# Patient Record
(demographics unavailable — no encounter records)

---

## 2025-05-15 NOTE — LETTER BODY
[Dear  ___] : Dear  [unfilled], [Courtesy Letter:] : I had the pleasure of seeing your patient, [unfilled], in my office today. [Please see my note below.] : Please see my note below. [Consult Closing:] : Thank you very much for allowing me to participate in the care of this patient.  If you have any questions, please do not hesitate to contact me. [Sincerely,] : Sincerely, [FreeTextEntry3] : Tea Fowler MD Director of Robotic Education [facility] at [facility]   [email or web address] [phone]

## 2025-05-15 NOTE — LETTER BODY
[Dear  ___] : Dear  [unfilled], [Courtesy Letter:] : I had the pleasure of seeing your patient, [unfilled], in my office today. [Please see my note below.] : Please see my note below. [Consult Closing:] : Thank you very much for allowing me to participate in the care of this patient.  If you have any questions, please do not hesitate to contact me. [Sincerely,] : Sincerely, [FreeTextEntry3] : Tea Fowler MD Director of Robotic Education [facility] at [facility]   [email or web address] [phone]

## 2025-05-15 NOTE — HISTORY OF PRESENT ILLNESS
[FreeTextEntry1] : Name MACK HUYNH  MRN [id number]   Aug 10 1939  Contact Number: [phone] ----------------------------------------------------------------------------------------------------------------------------------------- Date of First Visit: 25 Referring Provider/PCP: Dr. Zara Gambino f: [phone] -----------------------------------------------------------------------------------------------------------------------------------------  CC: nocturia, recurrent UTIs  History of Present Illness: MACK HUYNH is a 85 year old female who reports about 2 years of issues with urination. Reports about 3-5 UTIs/year. Symptoms suprapubic discomfort, mild burning, frequency. Goes to urgent care and gets treated with antibiotics. No cultures here. Symptoms go away with antibiotics but after a few weeks feels like come back. No fevers or flank pain with episodes. no hematuria. No kidney stones. No particles in urine. Recent episode last week - just completed 7 days of Macrobid. Symptoms improved but not yet back to baseline.  Outside of infections, urinary frequency, urgency, small amount of urge incontinence. Wears 1-3 pads a day but not very wet. Does not feel like empties bladder fully. Does not feel like has prolapse. Nocturia.  Prior treatments: a lot of water, abx with episodes  Patient reports had episode of back pain 2023 and had CT scan that showed small mass. Ultimately felt to be secondary to spinal stenosis.  CT ABDOMEN PELVIS WITHOUT IV CONTRAST 2023 Findings:  01. LIVER: Hepatic dome has been excluded. Otherwise, normal unenhanced. 02. SPLEEN: Normal unenhanced.  03. PANCREAS: Normal unenhanced. 04. GALLBLADDER/BILIARY TREE: Normal gallbladder. Normal caliber biliary tree.  05. ADRENALS: Normal. 06. KIDNEYS: Symmetric in size. Right posterior interpolar 9 mm partially exophytic lesion does not meet the criteria for a simple cyst (series 2 image 19). Mild right hydronephrosis to the ureteropelvic junction. No left hydronephrosis. No renal calculi. 07. LYMPHADENOPATHY/RETROPERITONEUM: No lymphadenopathy. 08. BOWEL: Small hiatal hernia. No bowel obstruction.  09. PELVIC VISCERA: Normal bladder, uterus and adnexa. 10. PELVIC LYMPH NODES: No pelvic lymphadenopathy. 11. VASCULATURE: Mild to moderate atherosclerotic disease of the abdominal aorta without aneurysm. 12. PERITONEUM/ABDOMINAL WALL: No ascites.    13. SKELETAL: Degenerative changes in the spine.  No aggressive lesion.   14. LUNG BASES: Clear.   IMPRESSION:  No renal calculus. Mild right hydronephrosis to the ureteropelvic junction possibly secondary to UPJ obstruction. Indeterminate right posterior interpolar lesion may represent a hemorrhagic/proteinaceous cyst or a neoplasm. If prior examinations are unavailable for comparison, a contrast-enhanced examination may be performed for further evaluation.  Bladder triggers: occasional caffeine, no carbonation, a lot of citrus, no spicy foods, a lot of tomatoes, occasional pineapple, no alcohol, no smoking  PVR (to ensure adequate emptying): 14    PMH: spinal stenosis, arthritis, HTN, hypothyroid PSH: shoulder replacement, knee replacements,  Family History: no  malignancies Social: , 4 children, retired  now takes care of son, never smoker, no alcohol, no recreational drugs Meds: amlodipine, besylate, levothyroxine, lisinopril, atorvastatin, calcium, metoprolol Allergies: NKDA ROS: no fevers, chills, flank pain

## 2025-05-15 NOTE — ASSESSMENT
[FreeTextEntry1] : 85 year old with recurrent UTIs, OAB, small renal mass on iamging, incidental R UPJO.  1) Recurrent UTIs: Recurrent UTI is defined as two episodes of acute bacterial cystitis within six months or three episodes within one year. Patient meets criteria. Episodes are classified as uncomplicated given absence of known anatomic or functional abnormality of the urinary tract, immunocompromised host,  infection with multi-drug resistant bacteria, or recurrence within two weeks of initial treatment. We discussed the natural history of UTIs and strategies to prevent incidence of UTIs. We discussed the data related to increase water intake and cranberry extract daily. We discussed antibiotic prophylaxis both continuous and post-coital. Discussed role of estrace.  2) OAB: OAB is a clinical diagnosis characterized by the presence of bothersome urinary symptoms. It is a symptom complex with a variable and chronic course that needs to be managed over time, that it primarily affects QOL, that there is no single ideal treatment and that available treatments vary in the effort required from the patient as well as in invasiveness, risk of adverse events and reversibility. First line treatment is behavioral modifications: bladder training with pelvic floor physical therapy, fluid management, caffeine reduction, dietary changes (avoiding bladder irritants). Next line oral anti-muscarinics or oral 3-adrenoceptor agonists. Third line PTNS and neuromodulation. Fourth line more invasive surgical treatment. Not interested in PFPT. Wants to trial med. Vibegron. The side effects of Vibegron were discussed in detail. These include but are not limited to allergic reaction (hives, problems breathing), elevation of blood pressure, fast heartbeat, difficulty urinating, headache, dry eyes, dry mouth, constipation, and nasal congestion.  3) Renal mass - Right posterior interpolar 9 mm partially exophytic lesion does not meet the criteria for a simple cyst.  may represent a hemorrhagic/proteinaceous cyst or a neoplasm. We reviewed the possible underlying histology of solid enhancing renal masses, with the majority being malignant (~80%) whereas ~20% are benign (e.g. oncocytoma).  We discussed the role/possibility of percutaneous biopsy, with the associated risks, benefits, complications, and accuracy issues (e.g. risk of false negative results).The heterogeneous natural history/biology of renal cell carcinoma was discussed, including the fact that while many renal cancers are indolent, others behave aggressively. Options were reviewed including, not limited to, active surveillance (AS), surgical extirpation and ablation. At this time will reimage given has not been images since 9/2023.  4) UPJO incidental on imaging - will revaluate with CTU and go from there and Cr today. No CVA tenderness, does report back pain but due to spinal stenosis. Never had complicated UTI and unclear if related.  Plan: - UA culture - BMP - estrogen cream - instructed on use - probiotics - cranberry pills - vibegron - CTU for assessment renal mass and UPJO - fu after imaging  I am the primary provider managing this condition and will be seeing the patient longitudinally.

## 2025-05-15 NOTE — HISTORY OF PRESENT ILLNESS
[FreeTextEntry1] : Name MACK HUYNH  MRN [id number]   Aug 10 1939  Contact Number: [phone] ----------------------------------------------------------------------------------------------------------------------------------------- Date of First Visit: 25 Referring Provider/PCP: Dr. Zara Gambino f: [phone] -----------------------------------------------------------------------------------------------------------------------------------------  CC: nocturia, recurrent UTIs  History of Present Illness: MACK HUYNH is a 85 year old female who reports about 2 years of issues with urination. Reports about 3-5 UTIs/year. Symptoms suprapubic discomfort, mild burning, frequency. Goes to urgent care and gets treated with antibiotics. No cultures here. Symptoms go away with antibiotics but after a few weeks feels like come back. No fevers or flank pain with episodes. no hematuria. No kidney stones. No particles in urine. Recent episode last week - just completed 7 days of Macrobid. Symptoms improved but not yet back to baseline.  Outside of infections, urinary frequency, urgency, small amount of urge incontinence. Wears 1-3 pads a day but not very wet. Does not feel like empties bladder fully. Does not feel like has prolapse. Nocturia.  Prior treatments: a lot of water, abx with episodes  Patient reports had episode of back pain 2023 and had CT scan that showed small mass. Ultimately felt to be secondary to spinal stenosis.  CT ABDOMEN PELVIS WITHOUT IV CONTRAST 2023 Findings:  01. LIVER: Hepatic dome has been excluded. Otherwise, normal unenhanced. 02. SPLEEN: Normal unenhanced.  03. PANCREAS: Normal unenhanced. 04. GALLBLADDER/BILIARY TREE: Normal gallbladder. Normal caliber biliary tree.  05. ADRENALS: Normal. 06. KIDNEYS: Symmetric in size. Right posterior interpolar 9 mm partially exophytic lesion does not meet the criteria for a simple cyst (series 2 image 19). Mild right hydronephrosis to the ureteropelvic junction. No left hydronephrosis. No renal calculi. 07. LYMPHADENOPATHY/RETROPERITONEUM: No lymphadenopathy. 08. BOWEL: Small hiatal hernia. No bowel obstruction.  09. PELVIC VISCERA: Normal bladder, uterus and adnexa. 10. PELVIC LYMPH NODES: No pelvic lymphadenopathy. 11. VASCULATURE: Mild to moderate atherosclerotic disease of the abdominal aorta without aneurysm. 12. PERITONEUM/ABDOMINAL WALL: No ascites.    13. SKELETAL: Degenerative changes in the spine.  No aggressive lesion.   14. LUNG BASES: Clear.   IMPRESSION:  No renal calculus. Mild right hydronephrosis to the ureteropelvic junction possibly secondary to UPJ obstruction. Indeterminate right posterior interpolar lesion may represent a hemorrhagic/proteinaceous cyst or a neoplasm. If prior examinations are unavailable for comparison, a contrast-enhanced examination may be performed for further evaluation.  Bladder triggers: occasional caffeine, no carbonation, a lot of citrus, no spicy foods, a lot of tomatoes, occasional pineapple, no alcohol, no smoking  PVR (to ensure adequate emptying): 14    PMH: spinal stenosis, arthritis, HTN, hypothyroid PSH: shoulder replacement, knee replacements,  Family History: no  malignancies Social: , 4 children, retired  now takes care of son, never smoker, no alcohol, no recreational drugs Meds: amlodipine, besylate, levothyroxine, lisinopril, atorvastatin, calcium, metoprolol Allergies: NKDA ROS: no fevers, chills, flank pain

## 2025-05-22 NOTE — LETTER BODY
[Dear  ___] : Dear  [unfilled], [Courtesy Letter:] : I had the pleasure of seeing your patient, [unfilled], in my office today. [Please see my note below.] : Please see my note below. [Consult Closing:] : Thank you very much for allowing me to participate in the care of this patient.  If you have any questions, please do not hesitate to contact me. [Sincerely,] : Sincerely, [FreeTextEntry3] : Tea Fowler MD Director of Robotic Education [facility] for [facility] at [facility]   [email or web address] [phone]

## 2025-05-22 NOTE — HISTORY OF PRESENT ILLNESS
[FreeTextEntry1] : Name MACK HUYNH  MRN [id number]   Aug 10 1939  Contact Number: [phone] ----------------------------------------------------------------------------------------------------------------------------------------- Date of First Visit: 25 Referring Provider/PCP: Dr. Zara Gambino f: [phone] -----------------------------------------------------------------------------------------------------------------------------------------  CC: nocturia, recurrent UTIs  History of Present Illness: MACK HUYNH is a 85 year old female who reports about 2 years of issues with urination. Reports about 3-5 UTIs/year. Symptoms suprapubic discomfort, mild burning, frequency. Goes to urgent care and gets treated with antibiotics. No cultures here. Symptoms go away with antibiotics but after a few weeks feels like come back. No fevers or flank pain with episodes. no hematuria. No kidney stones. No particles in urine. Recent episode last week - just completed 7 days of Macrobid. Symptoms improved but not yet back to baseline.  Outside of infections, urinary frequency, urgency, small amount of urge incontinence. Wears 1-3 pads a day but not very wet. Does not feel like empties bladder fully. Does not feel like has prolapse. Nocturia.  Prior treatments: a lot of water, abx with episodes  Patient reports had episode of back pain 2023 and had CT scan that showed small mass. Ultimately felt to be secondary to spinal stenosis.  CT ABDOMEN PELVIS WITHOUT IV CONTRAST 2023 Findings:  01. LIVER: Hepatic dome has been excluded. Otherwise, normal unenhanced. 02. SPLEEN: Normal unenhanced.  03. PANCREAS: Normal unenhanced. 04. GALLBLADDER/BILIARY TREE: Normal gallbladder. Normal caliber biliary tree.  05. ADRENALS: Normal. 06. KIDNEYS: Symmetric in size. Right posterior interpolar 9 mm partially exophytic lesion does not meet the criteria for a simple cyst (series 2 image 19). Mild right hydronephrosis to the ureteropelvic junction. No left hydronephrosis. No renal calculi. 07. LYMPHADENOPATHY/RETROPERITONEUM: No lymphadenopathy. 08. BOWEL: Small hiatal hernia. No bowel obstruction.  09. PELVIC VISCERA: Normal bladder, uterus and adnexa. 10. PELVIC LYMPH NODES: No pelvic lymphadenopathy. 11. VASCULATURE: Mild to moderate atherosclerotic disease of the abdominal aorta without aneurysm. 12. PERITONEUM/ABDOMINAL WALL: No ascites.    13. SKELETAL: Degenerative changes in the spine.  No aggressive lesion.   14. LUNG BASES: Clear.   IMPRESSION:  No renal calculus. Mild right hydronephrosis to the ureteropelvic junction possibly secondary to UPJ obstruction. Indeterminate right posterior interpolar lesion may represent a hemorrhagic/proteinaceous cyst or a neoplasm. If prior examinations are unavailable for comparison, a contrast-enhanced examination may be performed for further evaluation.  Bladder triggers: occasional caffeine, no carbonation, a lot of citrus, no spicy foods, a lot of tomatoes, occasional pineapple, no alcohol, no smoking  PVR (to ensure adequate emptying): 14   ---------------------------------------------------------------------------------------------------------------------------------------- Interval History (2025):  CTU 25: FINDINGS: VISUALIZED PORTION OF CHEST LUNGS: Stable scarring left base and stable compressive atelectasis medial segment right middle lobe. New 6 mm pleural-based nodule left lower lobe HEART: Unremarkable. FINDINGS: ABDOMEN/PELVIS LIVER: The liver is normal in size and morphology. No evidence for fatty liver. No evidence for mass lesion by IV contrast enhanced technique. BILIARY: Gallbladder is visualized. Normal caliber common bile duct. PANCREAS: Normal configuration with no mass or pancreatic duct dilatation. SPLEEN: Normal in size. No splenic lesion. ADRENAL GLANDS: The right adrenal gland is normal left adrenal gland is hyperplastic KIDNEYS: 6 x 7 mm enhancing cortical lesion posterior aspect mid pole right kidney and on examination with my measurements this measured 8 x 7 mm Both kidneys demonstrate normal function bilaterally with no evidence of renal calculi. There are no filling defects demonstrated the collecting systems of both kidneys. Bilateral dilated extrarenal pelvis. Fullness is demonstrated in the collecting system right kidney without change and no evidence of hydronephrosis left kidney. Stable 9 mm cyst left kidney posterior aspect mid pole no further workup or follow-up needed. URETERS: The ureters demonstrate normal course and caliber. There are no filling defects in the collecting system. No ureteral calculi. URINARY BLADDER: The bladder has normal appearance with a thin wall and without mass lesion or calculus. REPRODUCTIVE ORGANS: Uterus is normal in appearance. No discrete adnexal mass. STOMACH: Unremarkable. SMALL BOWEL: There is a new nonobstructive small bowel loop in the indirect left inguinal hernia LARGE BOWEL: Unremarkable. PERITONEUM: The peritoneum is without evidence for mass lesion or ascites. LYMPH NODES: No enlarged lymph nodes within paraaortic and iliac chain distribution. No enlarged mesenteric nodes or deep pelvic lymph nodes. VASCULATURE: Atherosclerotic calcification with no evidence of aneurysm SOFT TISSUES: Small containing umbilical hernia. Small left indirect left inguinal hernia containing fat and nonobstructed small bowel. BONES: Multilevel degenerative changes in the lower thoracic and lumbar spines.  IMPRESSION:   1. Stable 6 x 7 mm enhancing cortical lesion in the posterior aspect mid pole right kidney with two-year stability continue follow-up in 2-3 year's time. 2. Small left direct inguinal hernia containing fat and a nonobstructive loop of jejunum. 3.New 6 mm pleural-based nodule left lower lobe CT of the chest is recommended.  ---------------------------------------------------------------------------------------------------------------------------------------- PMH: spinal stenosis, arthritis, HTN, hypothyroid PSH: shoulder replacement, knee replacements,  Family History: no  malignancies Social: , 4 children, retired  now takes care of son, never smoker, no alcohol, no recreational drugs Meds: amlodipine, besylate, levothyroxine, lisinopril, atorvastatin, calcium, metoprolol Allergies: NKDA ROS: no fevers, chills, flank pain

## 2025-05-23 NOTE — ASSESSMENT
S/p stent placement in February.   Non-compliant with ASA and Plavix.   ASA restarted 4/12/25  CV following   [FreeTextEntry1] : 85 year old with recurrent UTIs, OAB, small renal mass on iamging, incidental R UPJO.  1) Recurrent UTIs: Recurrent UTI is defined as two episodes of acute bacterial cystitis within six months or three episodes within one year. Patient meets criteria. Episodes are classified as uncomplicated given absence of known anatomic or functional abnormality of the urinary tract, immunocompromised host,  infection with multi-drug resistant bacteria, or recurrence within two weeks of initial treatment. We discussed the natural history of UTIs and strategies to prevent incidence of UTIs. We discussed the data related to increase water intake and cranberry extract daily. We discussed antibiotic prophylaxis both continuous and post-coital. Continue estrace.  2) OAB: OAB is a clinical diagnosis characterized by the presence of bothersome urinary symptoms. It is a symptom complex with a variable and chronic course that needs to be managed over time, that it primarily affects QOL, that there is no single ideal treatment and that available treatments vary in the effort required from the patient as well as in invasiveness, risk of adverse events and reversibility. First line treatment is behavioral modifications: bladder training with pelvic floor physical therapy, fluid management, caffeine reduction, dietary changes (avoiding bladder irritants). Next line oral anti-muscarinics or oral 3-adrenoceptor agonists. Third line PTNS and neuromodulation. Fourth line more invasive surgical treatment. Not interested in PFPT. Plan for mirabegron given bibegron not covered. The side effects of Mirabegron were discussed in detail. These include but are not limited to allergic reaction (hives, problems breathing), elevation of blood pressure, fast heartbeat, difficulty urinating, headache, dry eyes, dry mouth, constipation, and nasal congestion. Continue estrace cream.  3) Renal mass - 2023: Right posterior interpolar 9 mm partially exophytic lesion does not meet the criteria for a simple cyst.  may represent a hemorrhagic/proteinaceous cyst or a neoplasm. CTU 5/2025: Stable 6 x 7 mm enhancing cortical lesion in the posterior aspect mid pole right kidney with two-year stability.  We reviewed the possible underlying histology of solid enhancing renal masses, with the majority being malignant (~80%) whereas ~20% are benign (e.g. oncocytoma).  We discussed the role/possibility of percutaneous biopsy, with the associated risks, benefits, complications, and accuracy issues (e.g. risk of false negative results).The heterogeneous natural history/biology of renal cell carcinoma was discussed, including the fact that while many renal cancers are indolent, others behave aggressively. Options were reviewed including, not limited to, active surveillance (AS), surgical extirpation and ablation. The risks of tumor growth and metastasis on active surveillance were reviewed, including the fact that metastatic progression on AS could mean missing the opportunity for cure.  The average growth rate of ~2-3 mm/year and metastasis rates of ~2-3% on AS over 5 year interval for small renal masses <4 cm was discussed. Given age, size, stability will continue to monitor, plan for imaging 1 year.  4) UPJO incidental on imaging - Cr wnl. No CVA tenderness, does report back pain but due to spinal stenosis. Never had complicated UTI and unclear if related. CTU 5/2025 Bilateral dilated extrarenal pelvis. Fullness is demonstrated in the collecting system right kidney without change and no evidence of hydronephrosis left kidney. Images reviewed - suspect incidental finding. Options discussed with patient including further work-up with Mag3 vs observation. Given stability fo finding, no cindy hydro, normal renal function, no complicated UTIs will observe for now with observation of renal lesion.  Plan: - continue estrogen cream - instructed on use - probiotics - cranberry pills - start vibegron - imaging one year for renal imaging - PCP re incidental findings - discussed hernia and pulm nodule - sent message to [facility] for hernia - fu 6 weeks impact mirabegron  I am the primary provider managing this condition and will be seeing the patient longitudinally.

## 2025-05-23 NOTE — HISTORY OF PRESENT ILLNESS
[FreeTextEntry1] : Name MACK HUYNH  MRN [id number]   Aug 10 1939  Contact Number: [phone] ----------------------------------------------------------------------------------------------------------------------------------------- Date of First Visit: 25 Referring Provider/PCP: Dr. Zara Gambino f: [phone] -----------------------------------------------------------------------------------------------------------------------------------------  CC: nocturia, recurrent UTIs  History of Present Illness: MACK HUYNH is a 85 year old female who reports about 2 years of issues with urination. Reports about 3-5 UTIs/year. Symptoms suprapubic discomfort, mild burning, frequency. Goes to urgent care and gets treated with antibiotics. No cultures here. Symptoms go away with antibiotics but after a few weeks feels like come back. No fevers or flank pain with episodes. no hematuria. No kidney stones. No particles in urine. Recent episode last week - just completed 7 days of Macrobid. Symptoms improved but not yet back to baseline.  Outside of infections, urinary frequency, urgency, small amount of urge incontinence. Wears 1-3 pads a day but not very wet. Does not feel like empties bladder fully. Does not feel like has prolapse. Nocturia.  Prior treatments: a lot of water, abx with episodes  Patient reports had episode of back pain 2023 and had CT scan that showed small mass. Ultimately felt to be secondary to spinal stenosis.  CT ABDOMEN PELVIS WITHOUT IV CONTRAST 2023 Findings:  01. LIVER: Hepatic dome has been excluded. Otherwise, normal unenhanced. 02. SPLEEN: Normal unenhanced.  03. PANCREAS: Normal unenhanced. 04. GALLBLADDER/BILIARY TREE: Normal gallbladder. Normal caliber biliary tree.  05. ADRENALS: Normal. 06. KIDNEYS: Symmetric in size. Right posterior interpolar 9 mm partially exophytic lesion does not meet the criteria for a simple cyst (series 2 image 19). Mild right hydronephrosis to the ureteropelvic junction. No left hydronephrosis. No renal calculi. 07. LYMPHADENOPATHY/RETROPERITONEUM: No lymphadenopathy. 08. BOWEL: Small hiatal hernia. No bowel obstruction.  09. PELVIC VISCERA: Normal bladder, uterus and adnexa. 10. PELVIC LYMPH NODES: No pelvic lymphadenopathy. 11. VASCULATURE: Mild to moderate atherosclerotic disease of the abdominal aorta without aneurysm. 12. PERITONEUM/ABDOMINAL WALL: No ascites.    13. SKELETAL: Degenerative changes in the spine.  No aggressive lesion.   14. LUNG BASES: Clear.   IMPRESSION:  No renal calculus. Mild right hydronephrosis to the ureteropelvic junction possibly secondary to UPJ obstruction. Indeterminate right posterior interpolar lesion may represent a hemorrhagic/proteinaceous cyst or a neoplasm. If prior examinations are unavailable for comparison, a contrast-enhanced examination may be performed for further evaluation.  Bladder triggers: occasional caffeine, no carbonation, a lot of citrus, no spicy foods, a lot of tomatoes, occasional pineapple, no alcohol, no smoking  PVR (to ensure adequate emptying): 14   ---------------------------------------------------------------------------------------------------------------------------------------- Interval History (2025):  Labs 25: UA nit neg LE small 3 WBC 0 RBC +epithelial cells Culture <10K phill BMP Cr 0.7  CTU 25: FINDINGS: VISUALIZED PORTION OF CHEST LUNGS: Stable scarring left base and stable compressive atelectasis medial segment right middle lobe. New 6 mm pleural-based nodule left lower lobe HEART: Unremarkable. FINDINGS: ABDOMEN/PELVIS LIVER: The liver is normal in size and morphology. No evidence for fatty liver. No evidence for mass lesion by IV contrast enhanced technique. BILIARY: Gallbladder is visualized. Normal caliber common bile duct. PANCREAS: Normal configuration with no mass or pancreatic duct dilatation. SPLEEN: Normal in size. No splenic lesion. ADRENAL GLANDS: The right adrenal gland is normal left adrenal gland is hyperplastic KIDNEYS: 6 x 7 mm enhancing cortical lesion posterior aspect mid pole right kidney and on examination with my measurements this measured 8 x 7 mm Both kidneys demonstrate normal function bilaterally with no evidence of renal calculi. There are no filling defects demonstrated the collecting systems of both kidneys. Bilateral dilated extrarenal pelvis. Fullness is demonstrated in the collecting system right kidney without change and no evidence of hydronephrosis left kidney. Stable 9 mm cyst left kidney posterior aspect mid pole no further workup or follow-up needed. URETERS: The ureters demonstrate normal course and caliber. There are no filling defects in the collecting system. No ureteral calculi. URINARY BLADDER: The bladder has normal appearance with a thin wall and without mass lesion or calculus. REPRODUCTIVE ORGANS: Uterus is normal in appearance. No discrete adnexal mass. STOMACH: Unremarkable. SMALL BOWEL: There is a new nonobstructive small bowel loop in the indirect left inguinal hernia LARGE BOWEL: Unremarkable. PERITONEUM: The peritoneum is without evidence for mass lesion or ascites. LYMPH NODES: No enlarged lymph nodes within paraaortic and iliac chain distribution. No enlarged mesenteric nodes or deep pelvic lymph nodes. VASCULATURE: Atherosclerotic calcification with no evidence of aneurysm SOFT TISSUES: Small containing umbilical hernia. Small left indirect left inguinal hernia containing fat and nonobstructed small bowel. BONES: Multilevel degenerative changes in the lower thoracic and lumbar spines.  IMPRESSION:   1. Stable 6 x 7 mm enhancing cortical lesion in the posterior aspect mid pole right kidney with two-year stability continue follow-up in 2-3 year's time. 2. Small left direct inguinal hernia containing fat and a nonobstructive loop of jejunum. 3.New 6 mm pleural-based nodule left lower lobe CT of the chest is recommended.  Started estrcae cream. Insurance did not cover vibegron.   PVR (to ensure adequate emptying): 75 ---------------------------------------------------------------------------------------------------------------------------------------- PMH: spinal stenosis, arthritis, HTN, hypothyroid PSH: shoulder replacement, knee replacements,  Family History: no  malignancies Social: , 4 children, retired  now takes care of son, never smoker, no alcohol, no recreational drugs Meds: amlodipine, besylate, levothyroxine, lisinopril, atorvastatin, calcium, metoprolol Allergies: NKDA ROS: no fevers, chills, flank pain

## 2025-05-23 NOTE — LETTER BODY
[FreeTextEntry3] : Tea Fowler MD Director of Robotic Education [facility]   [email or web address] [phone]

## 2025-05-30 NOTE — HISTORY OF PRESENT ILLNESS
[FreeTextEntry1] : Name MACK HUYNH  MRN [id number]   Aug 10 1939  Contact Number: [phone] ----------------------------------------------------------------------------------------------------------------------------------------- Date of First Visit: 25 Referring Provider/PCP: Dr. Zara Gambino f: [phone] -----------------------------------------------------------------------------------------------------------------------------------------  CC: nocturia, recurrent UTIs  History of Present Illness: MACK HUYNH is a 85 year old female who reports about 2 years of issues with urination. Reports about 3-5 UTIs/year. Symptoms suprapubic discomfort, mild burning, frequency. Goes to urgent care and gets treated with antibiotics. No cultures here. Symptoms go away with antibiotics but after a few weeks feels like come back. No fevers or flank pain with episodes. no hematuria. No kidney stones. No particles in urine. Recent episode last week - just completed 7 days of Macrobid. Symptoms improved but not yet back to baseline.  Outside of infections, urinary frequency, urgency, small amount of urge incontinence. Wears 1-3 pads a day but not very wet. Does not feel like empties bladder fully. Does not feel like has prolapse. Nocturia.  Prior treatments: a lot of water, abx with episodes  Patient reports had episode of back pain 2023 and had CT scan that showed small mass. Ultimately felt to be secondary to spinal stenosis.  CT ABDOMEN PELVIS WITHOUT IV CONTRAST 2023 Findings:  01. LIVER: Hepatic dome has been excluded. Otherwise, normal unenhanced. 02. SPLEEN: Normal unenhanced.  03. PANCREAS: Normal unenhanced. 04. GALLBLADDER/BILIARY TREE: Normal gallbladder. Normal caliber biliary tree.  05. ADRENALS: Normal. 06. KIDNEYS: Symmetric in size. Right posterior interpolar 9 mm partially exophytic lesion does not meet the criteria for a simple cyst (series 2 image 19). Mild right hydronephrosis to the ureteropelvic junction. No left hydronephrosis. No renal calculi. 07. LYMPHADENOPATHY/RETROPERITONEUM: No lymphadenopathy. 08. BOWEL: Small hiatal hernia. No bowel obstruction.  09. PELVIC VISCERA: Normal bladder, uterus and adnexa. 10. PELVIC LYMPH NODES: No pelvic lymphadenopathy. 11. VASCULATURE: Mild to moderate atherosclerotic disease of the abdominal aorta without aneurysm. 12. PERITONEUM/ABDOMINAL WALL: No ascites.    13. SKELETAL: Degenerative changes in the spine.  No aggressive lesion.   14. LUNG BASES: Clear.   IMPRESSION:  No renal calculus. Mild right hydronephrosis to the ureteropelvic junction possibly secondary to UPJ obstruction. Indeterminate right posterior interpolar lesion may represent a hemorrhagic/proteinaceous cyst or a neoplasm. If prior examinations are unavailable for comparison, a contrast-enhanced examination may be performed for further evaluation.  Bladder triggers: occasional caffeine, no carbonation, a lot of citrus, no spicy foods, a lot of tomatoes, occasional pineapple, no alcohol, no smoking  PVR (to ensure adequate emptying): 14   ---------------------------------------------------------------------------------------------------------------------------------------- Interval History (2025):  Labs 25: UA nit neg LE small 3 WBC 0 RBC +epithelial cells Culture <10K phill BMP Cr 0.7  CTU 25: FINDINGS: VISUALIZED PORTION OF CHEST LUNGS: Stable scarring left base and stable compressive atelectasis medial segment right middle lobe. New 6 mm pleural-based nodule left lower lobe HEART: Unremarkable. FINDINGS: ABDOMEN/PELVIS LIVER: The liver is normal in size and morphology. No evidence for fatty liver. No evidence for mass lesion by IV contrast enhanced technique. BILIARY: Gallbladder is visualized. Normal caliber common bile duct. PANCREAS: Normal configuration with no mass or pancreatic duct dilatation. SPLEEN: Normal in size. No splenic lesion. ADRENAL GLANDS: The right adrenal gland is normal left adrenal gland is hyperplastic KIDNEYS: 6 x 7 mm enhancing cortical lesion posterior aspect mid pole right kidney and on examination with my measurements this measured 8 x 7 mm Both kidneys demonstrate normal function bilaterally with no evidence of renal calculi. There are no filling defects demonstrated the collecting systems of both kidneys. Bilateral dilated extrarenal pelvis. Fullness is demonstrated in the collecting system right kidney without change and no evidence of hydronephrosis left kidney. Stable 9 mm cyst left kidney posterior aspect mid pole no further workup or follow-up needed. URETERS: The ureters demonstrate normal course and caliber. There are no filling defects in the collecting system. No ureteral calculi. URINARY BLADDER: The bladder has normal appearance with a thin wall and without mass lesion or calculus. REPRODUCTIVE ORGANS: Uterus is normal in appearance. No discrete adnexal mass. STOMACH: Unremarkable. SMALL BOWEL: There is a new nonobstructive small bowel loop in the indirect left inguinal hernia LARGE BOWEL: Unremarkable. PERITONEUM: The peritoneum is without evidence for mass lesion or ascites. LYMPH NODES: No enlarged lymph nodes within paraaortic and iliac chain distribution. No enlarged mesenteric nodes or deep pelvic lymph nodes. VASCULATURE: Atherosclerotic calcification with no evidence of aneurysm SOFT TISSUES: Small containing umbilical hernia. Small left indirect left inguinal hernia containing fat and nonobstructed small bowel. BONES: Multilevel degenerative changes in the lower thoracic and lumbar spines.  IMPRESSION:   1. Stable 6 x 7 mm enhancing cortical lesion in the posterior aspect mid pole right kidney with two-year stability continue follow-up in 2-3 year's time. 2. Small left direct inguinal hernia containing fat and a nonobstructive loop of jejunum. 3.New 6 mm pleural-based nodule left lower lobe CT of the chest is recommended.  Started Estrace cream. Insurance did not cover vibegron.   PVR (to ensure adequate emptying): 75 ---------------------------------------------------------------------------------------------------------------------------------------- Interval History (2025):  Mirabegron was not covered. Symptoms baseline. Nipple sensitivity with estrace - stopped cream and subsided. Otherwise doing well, denies UTI symptoms. ---------------------------------------------------------------------------------------------------------------------------------------- PMH: spinal stenosis, arthritis, HTN, hypothyroid PSH: shoulder replacement, knee replacements,  Family History: no  malignancies Social: , 4 children, retired  now takes care of son, never smoker, no alcohol, no recreational drugs Meds: amlodipine, besylate, levothyroxine, lisinopril, atorvastatin, calcium, metoprolol Allergies: NKDA ROS: no fevers, chills, flank pain

## 2025-05-30 NOTE — LETTER BODY
[Dear  ___] : Dear  [unfilled], [Courtesy Letter:] : I had the pleasure of seeing your patient, [unfilled], in my office today. [Please see my note below.] : Please see my note below. [Consult Closing:] : Thank you very much for allowing me to participate in the care of this patient.  If you have any questions, please do not hesitate to contact me. [Sincerely,] : Sincerely, [FreeTextEntry3] : Tea Fowler MD Director of Robotic Education [facility]   [email or web address] [phone]

## 2025-05-30 NOTE — HISTORY OF PRESENT ILLNESS
[FreeTextEntry1] : Name MACK HUYNH  MRN [id number]   Aug 10 1939  Contact Number: [phone] ----------------------------------------------------------------------------------------------------------------------------------------- Date of First Visit: 25 Referring Provider/PCP: Dr. Zara Gambino f: [phone] -----------------------------------------------------------------------------------------------------------------------------------------  CC: nocturia, recurrent UTIs  History of Present Illness: MACK HUYNH is a 85 year old female who reports about 2 years of issues with urination. Reports about 3-5 UTIs/year. Symptoms suprapubic discomfort, mild burning, frequency. Goes to urgent care and gets treated with antibiotics. No cultures here. Symptoms go away with antibiotics but after a few weeks feels like come back. No fevers or flank pain with episodes. no hematuria. No kidney stones. No particles in urine. Recent episode last week - just completed 7 days of Macrobid. Symptoms improved but not yet back to baseline.  Outside of infections, urinary frequency, urgency, small amount of urge incontinence. Wears 1-3 pads a day but not very wet. Does not feel like empties bladder fully. Does not feel like has prolapse. Nocturia.  Prior treatments: a lot of water, abx with episodes  Patient reports had episode of back pain 2023 and had CT scan that showed small mass. Ultimately felt to be secondary to spinal stenosis.  CT ABDOMEN PELVIS WITHOUT IV CONTRAST 2023 Findings:  01. LIVER: Hepatic dome has been excluded. Otherwise, normal unenhanced. 02. SPLEEN: Normal unenhanced.  03. PANCREAS: Normal unenhanced. 04. GALLBLADDER/BILIARY TREE: Normal gallbladder. Normal caliber biliary tree.  05. ADRENALS: Normal. 06. KIDNEYS: Symmetric in size. Right posterior interpolar 9 mm partially exophytic lesion does not meet the criteria for a simple cyst (series 2 image 19). Mild right hydronephrosis to the ureteropelvic junction. No left hydronephrosis. No renal calculi. 07. LYMPHADENOPATHY/RETROPERITONEUM: No lymphadenopathy. 08. BOWEL: Small hiatal hernia. No bowel obstruction.  09. PELVIC VISCERA: Normal bladder, uterus and adnexa. 10. PELVIC LYMPH NODES: No pelvic lymphadenopathy. 11. VASCULATURE: Mild to moderate atherosclerotic disease of the abdominal aorta without aneurysm. 12. PERITONEUM/ABDOMINAL WALL: No ascites.    13. SKELETAL: Degenerative changes in the spine.  No aggressive lesion.   14. LUNG BASES: Clear.   IMPRESSION:  No renal calculus. Mild right hydronephrosis to the ureteropelvic junction possibly secondary to UPJ obstruction. Indeterminate right posterior interpolar lesion may represent a hemorrhagic/proteinaceous cyst or a neoplasm. If prior examinations are unavailable for comparison, a contrast-enhanced examination may be performed for further evaluation.  Bladder triggers: occasional caffeine, no carbonation, a lot of citrus, no spicy foods, a lot of tomatoes, occasional pineapple, no alcohol, no smoking  PVR (to ensure adequate emptying): 14   ---------------------------------------------------------------------------------------------------------------------------------------- Interval History (2025):  Labs 25: UA nit neg LE small 3 WBC 0 RBC +epithelial cells Culture <10K phill BMP Cr 0.7  CTU 25: FINDINGS: VISUALIZED PORTION OF CHEST LUNGS: Stable scarring left base and stable compressive atelectasis medial segment right middle lobe. New 6 mm pleural-based nodule left lower lobe HEART: Unremarkable. FINDINGS: ABDOMEN/PELVIS LIVER: The liver is normal in size and morphology. No evidence for fatty liver. No evidence for mass lesion by IV contrast enhanced technique. BILIARY: Gallbladder is visualized. Normal caliber common bile duct. PANCREAS: Normal configuration with no mass or pancreatic duct dilatation. SPLEEN: Normal in size. No splenic lesion. ADRENAL GLANDS: The right adrenal gland is normal left adrenal gland is hyperplastic KIDNEYS: 6 x 7 mm enhancing cortical lesion posterior aspect mid pole right kidney and on examination with my measurements this measured 8 x 7 mm Both kidneys demonstrate normal function bilaterally with no evidence of renal calculi. There are no filling defects demonstrated the collecting systems of both kidneys. Bilateral dilated extrarenal pelvis. Fullness is demonstrated in the collecting system right kidney without change and no evidence of hydronephrosis left kidney. Stable 9 mm cyst left kidney posterior aspect mid pole no further workup or follow-up needed. URETERS: The ureters demonstrate normal course and caliber. There are no filling defects in the collecting system. No ureteral calculi. URINARY BLADDER: The bladder has normal appearance with a thin wall and without mass lesion or calculus. REPRODUCTIVE ORGANS: Uterus is normal in appearance. No discrete adnexal mass. STOMACH: Unremarkable. SMALL BOWEL: There is a new nonobstructive small bowel loop in the indirect left inguinal hernia LARGE BOWEL: Unremarkable. PERITONEUM: The peritoneum is without evidence for mass lesion or ascites. LYMPH NODES: No enlarged lymph nodes within paraaortic and iliac chain distribution. No enlarged mesenteric nodes or deep pelvic lymph nodes. VASCULATURE: Atherosclerotic calcification with no evidence of aneurysm SOFT TISSUES: Small containing umbilical hernia. Small left indirect left inguinal hernia containing fat and nonobstructed small bowel. BONES: Multilevel degenerative changes in the lower thoracic and lumbar spines.  IMPRESSION:   1. Stable 6 x 7 mm enhancing cortical lesion in the posterior aspect mid pole right kidney with two-year stability continue follow-up in 2-3 year's time. 2. Small left direct inguinal hernia containing fat and a nonobstructive loop of jejunum. 3.New 6 mm pleural-based nodule left lower lobe CT of the chest is recommended.  Started Estrace cream. Insurance did not cover vibegron.   PVR (to ensure adequate emptying): 75 ---------------------------------------------------------------------------------------------------------------------------------------- Interval History (2025):  Mirabegron was not covered. Symptoms baseline. Nipple sensitivity with estrace - stopped cream and subsided. Otherwise doing well, denies UTI symptoms. ---------------------------------------------------------------------------------------------------------------------------------------- PMH: spinal stenosis, arthritis, HTN, hypothyroid PSH: shoulder replacement, knee replacements,  Family History: no  malignancies Social: , 4 children, retired  now takes care of son, never smoker, no alcohol, no recreational drugs Meds: amlodipine, besylate, levothyroxine, lisinopril, atorvastatin, calcium, metoprolol Allergies: NKDA ROS: no fevers, chills, flank pain

## 2025-05-30 NOTE — LETTER BODY
[Dear  ___] : Dear  [unfilled], [Courtesy Letter:] : I had the pleasure of seeing your patient, [unfilled], in my office today. [Please see my note below.] : Please see my note below. [Consult Closing:] : Thank you very much for allowing me to participate in the care of this patient.  If you have any questions, please do not hesitate to contact me. [Sincerely,] : Sincerely, [FreeTextEntry3] : Tea Fowler MD Director of Robotic Education [facility] at [facility]   [email or web address] [phone]

## 2025-06-06 NOTE — ADDENDUM
[FreeTextEntry1] : In summary, Ms. MACK HUYNH has a symptomatic left ventral hernia for which I recommend surgical repair at the patient's earliest convenience. I discussed the risks and benefits including, but not limited to, injury to intra-abdominal organs (bowel, bladder, nerves, spermatic cord/round ligament), bleeding, infection, use of mesh and recurrent hernia, and urinary retention. We discussed the role and complications of mesh at length. I also discussed the normal claudio- and post-operative course and the possibility of postoperative chronic pain. I answered all questions about this surgery and possible complications to the best of my ability and the patient verbalized understanding. Should unrelenting symptoms or signs of incarceration develop prior to surgery the patient knows to call or go to the emergency room.  Plan for robotic assisted left ventral hernia repair with mesh Will help establish PCP, will work on risk assessment/optimization  I, Dr. Remy Montesinos, spent 50 minutes with the patient >50% counseling/coordination of care including, reviewing the patient's history, performing an examination, reviewing relevant labs and radiographic imaging, reviewing PCP and consultant notes, discussion of medical and surgical management of the diagnosis as well as associated risks and benefits, and completing documentation.

## 2025-06-06 NOTE — ASSESSMENT
[FreeTextEntry1] : Pt is an 84 y/o F with pmhx of HTN controlled with amlodipine and lisinopril, hx of stable 6x7 mm enhancing cortical lesion in the posterior aspect mid pole of the right kidney with 2-3 yr f/u, spinal stenosis, hx of 2 open c sections with infraumbilical midline incision, hx of knee replacement, past shoulder surgery, new 6mm pleural-based nodule left lower lobe awaiting CT chest imaging, overactive bladder following  who referred pt for assessment of small left direct inguinal hernia containing fat and a nonobstructive loop of jejunum. Pt states she first noticed this problem a few months ago and experiences some left sided groin discomfort but denies any severe pain. Pt notes an unusual sensation in the left groin region associated with defecation. Denies any associated n,v,c,d, melena. Pt denies any cardiac hx and denies any hx of bleeding, blood clots, PE's. Pt states she is currently taking a daily baby ASA 81mg.   Pt states her PCP recently retired and she is planning to establish a new PCP today. Pt states she has not received the script to obtain chest CT to further assess pulmonary nodule of left lower lobe which she will follow up on. Pt expresses interest in surgical repair of her left lower ventral hernia as she has been experiencing discomfort in the left groin region. All of the risks, benefits, and alternatives to the proposed procedure were explained to the pt in great detail and pt wishes to proceed with sx scheduling.  I, Dr. Remy Montesinos personally performed the evaluation and management (E/M) services for this patient. That E/M includes conducting the clinically appropriate initial history &/or exam, assessing all conditions, and establishing the plan of care. Today, my PA, Carlee Warner, was present during my evaluation and management service for this patient and assisted in scribing the encounter and was here to observe my E/M service for this patient and follow plan of care established by me going forward.

## 2025-06-06 NOTE — HISTORY OF PRESENT ILLNESS
[de-identified] : Pt is an 84 y/o F with pmhx of HTN controlled with amlodipine and lisinopril, hx of stable 6x7 mm enhancing cortical lesion in the posterior aspect mid pole of the right kidney with 2-3 yr f/u, spinal stenosis, hx of 2 open c sections with infraumbilical midline incision, hx of knee replacement, past shoulder surgery, new 6mm pleural-based nodule left lower lobe awaiting CT chest imaging, overactive bladder following  who referred pt for assessment of small left direct inguinal hernia containing fat and a nonobstructive loop of jejunum. Pt states she first noticed this problem a few months ago and experiences some left sided groin discomfort but denies any severe pain. Pt notes an unusual sensation in the left groin associated with defecation. Denies any associated n,v,c,d, melena. Pt denies any cardiac hx and denies any hx of bleeding, blood clots, PE's. Pt states she is currently taking a daily baby ASA 81mg.

## 2025-06-06 NOTE — PLAN
[FreeTextEntry1] : schedule pt for robotic assisted laparoscopic left lower ventral hernia repair medical clearance

## 2025-07-15 NOTE — LETTER BODY
[Dear  ___] : Dear  [unfilled], [Courtesy Letter:] : I had the pleasure of seeing your patient, [unfilled], in my office today. [Please see my note below.] : Please see my note below. [Consult Closing:] : Thank you very much for allowing me to participate in the care of this patient.  If you have any questions, please do not hesitate to contact me. [Sincerely,] : Sincerely, [FreeTextEntry3] : Tea Fowler MD Director of Robotic Education [facility]   [email or web address] [phone]

## 2025-07-15 NOTE — ASSESSMENT
[FreeTextEntry1] : 85 year old with recurrent UTIs, OAB, small renal mass on iamging, incidental R UPJO.  1) Recurrent UTIs: Recurrent UTI is defined as two episodes of acute bacterial cystitis within six months or three episodes within one year. Patient meets criteria. Episodes are classified as uncomplicated given absence of known anatomic or functional abnormality of the urinary tract, immunocompromised host,  infection with multi-drug resistant bacteria, or recurrence within two weeks of initial treatment. We discussed the natural history of UTIs and strategies to prevent incidence of UTIs. We discussed the data related to increase water intake and cranberry extract daily. We discussed antibiotic prophylaxis both continuous and post-coital. Plan for cranberry and probiotics. Started estrace cream but felt nipple sensitivity so d/rene.  2) OAB: OAB is a clinical diagnosis characterized by the presence of bothersome urinary symptoms. It is a symptom complex with a variable and chronic course that needs to be managed over time, that it primarily affects QOL, that there is no single ideal treatment and that available treatments vary in the effort required from the patient as well as in invasiveness, risk of adverse events and reversibility. First line treatment is behavioral modifications: bladder training with pelvic floor physical therapy, fluid management, caffeine reduction, dietary changes (avoiding bladder irritants). Next line oral anti-muscarinics or oral 3-adrenoceptor agonists. Third line PTNS and neuromodulation. Fourth line more invasive surgical treatment. Not interested in PFPT. Started mirabegron no side effects but no significant change in symptoms over last month. Will increase dose. Side effects reviewed. Sent rx to compounding pharamcy.  3) Renal mass - 2023: Right posterior interpolar 9 mm partially exophytic lesion does not meet the criteria for a simple cyst.  may represent a hemorrhagic/proteinaceous cyst or a neoplasm. CTU 5/2025: Stable 6 x 7 mm enhancing cortical lesion in the posterior aspect mid pole right kidney with two-year stability. We reviewed the possible underlying histology of solid enhancing renal masses, with the majority being malignant (~80%) whereas ~20% are benign (e.g. oncocytoma).  We discussed the role/possibility of percutaneous biopsy, with the associated risks, benefits, complications, and accuracy issues (e.g. risk of false negative results).The heterogeneous natural history/biology of renal cell carcinoma was discussed, including the fact that while many renal cancers are indolent, others behave aggressively. Options were reviewed including, not limited to, active surveillance (AS), surgical extirpation and ablation. The risks of tumor growth and metastasis on active surveillance were reviewed, including the fact that metastatic progression on AS could mean missing the opportunity for cure.  The average growth rate of ~2-3 mm/year and metastasis rates of ~2-3% on AS over 5 year interval for small renal masses <4 cm was discussed. Given age, size, stability will continue to monitor, plan for imaging 1 year - 5/2026  4) UPJO incidental on imaging - Cr wnl. No CVA tenderness, does report back pain but due to spinal stenosis. Never had complicated UTI and unclear if related. CTU 5/2025 Bilateral dilated extrarenal pelvis. Fullness is demonstrated in the collecting system right kidney without change and no evidence of hydronephrosis left kidney. Images reviewed - suspect incidental finding. Options discussed with patient including further work-up with [facility] vs observation. Given stability of finding, no cindy hydro, normal renal function, no complicated UTIs will observe for now with observation of renal lesion.  Plan: - UA culture today - continue probiotics and cranberry pills - increase mirabegron - sent to compounding pharmacy - imaging one year for renal imaging - PCP re pulm nodule  - fu 2 months  I am the primary provider managing this condition and will be seeing the patient longitudinally.

## 2025-07-15 NOTE — HISTORY OF PRESENT ILLNESS
[FreeTextEntry1] : Name MACK HUYNH  MRN [id number]   Aug 10 1939  Contact Number: [phone] ----------------------------------------------------------------------------------------------------------------------------------------- Date of First Visit: 25 Referring Provider/PCP: Dr. Zara Gambino f: [phone] -----------------------------------------------------------------------------------------------------------------------------------------  CC: nocturia, recurrent UTIs  History of Present Illness: MACK HUYNH is a 85 year old female who reports about 2 years of issues with urination. Reports about 3-5 UTIs/year. Symptoms suprapubic discomfort, mild burning, frequency. Goes to urgent care and gets treated with antibiotics. No cultures here. Symptoms go away with antibiotics but after a few weeks feels like come back. No fevers or flank pain with episodes. no hematuria. No kidney stones. No particles in urine. Recent episode last week - just completed 7 days of Macrobid. Symptoms improved but not yet back to baseline.  Outside of infections, urinary frequency, urgency, small amount of urge incontinence. Wears 1-3 pads a day but not very wet. Does not feel like empties bladder fully. Does not feel like has prolapse. Nocturia.  Prior treatments: a lot of water, abx with episodes  Patient reports had episode of back pain 2023 and had CT scan that showed small mass. Ultimately felt to be secondary to spinal stenosis.  CT ABDOMEN PELVIS WITHOUT IV CONTRAST 2023 Findings:  01. LIVER: Hepatic dome has been excluded. Otherwise, normal unenhanced. 02. SPLEEN: Normal unenhanced.  03. PANCREAS: Normal unenhanced. 04. GALLBLADDER/BILIARY TREE: Normal gallbladder. Normal caliber biliary tree.  05. ADRENALS: Normal. 06. KIDNEYS: Symmetric in size. Right posterior interpolar 9 mm partially exophytic lesion does not meet the criteria for a simple cyst (series 2 image 19). Mild right hydronephrosis to the ureteropelvic junction. No left hydronephrosis. No renal calculi. 07. LYMPHADENOPATHY/RETROPERITONEUM: No lymphadenopathy. 08. BOWEL: Small hiatal hernia. No bowel obstruction.  09. PELVIC VISCERA: Normal bladder, uterus and adnexa. 10. PELVIC LYMPH NODES: No pelvic lymphadenopathy. 11. VASCULATURE: Mild to moderate atherosclerotic disease of the abdominal aorta without aneurysm. 12. PERITONEUM/ABDOMINAL WALL: No ascites.    13. SKELETAL: Degenerative changes in the spine.  No aggressive lesion.   14. LUNG BASES: Clear.   IMPRESSION:  No renal calculus. Mild right hydronephrosis to the ureteropelvic junction possibly secondary to UPJ obstruction. Indeterminate right posterior interpolar lesion may represent a hemorrhagic/proteinaceous cyst or a neoplasm. If prior examinations are unavailable for comparison, a contrast-enhanced examination may be performed for further evaluation.  Bladder triggers: occasional caffeine, no carbonation, a lot of citrus, no spicy foods, a lot of tomatoes, occasional pineapple, no alcohol, no smoking  PVR (to ensure adequate emptying): 14   ---------------------------------------------------------------------------------------------------------------------------------------- Interval History (2025):  Labs 25: UA nit neg LE small 3 WBC 0 RBC +epithelial cells Culture <10K phill BMP Cr 0.7  CTU 25: FINDINGS: VISUALIZED PORTION OF CHEST LUNGS: Stable scarring left base and stable compressive atelectasis medial segment right middle lobe. New 6 mm pleural-based nodule left lower lobe HEART: Unremarkable. FINDINGS: ABDOMEN/PELVIS LIVER: The liver is normal in size and morphology. No evidence for fatty liver. No evidence for mass lesion by IV contrast enhanced technique. BILIARY: Gallbladder is visualized. Normal caliber common bile duct. PANCREAS: Normal configuration with no mass or pancreatic duct dilatation. SPLEEN: Normal in size. No splenic lesion. ADRENAL GLANDS: The right adrenal gland is normal left adrenal gland is hyperplastic KIDNEYS: 6 x 7 mm enhancing cortical lesion posterior aspect mid pole right kidney and on examination with my measurements this measured 8 x 7 mm Both kidneys demonstrate normal function bilaterally with no evidence of renal calculi. There are no filling defects demonstrated the collecting systems of both kidneys. Bilateral dilated extrarenal pelvis. Fullness is demonstrated in the collecting system right kidney without change and no evidence of hydronephrosis left kidney. Stable 9 mm cyst left kidney posterior aspect mid pole no further workup or follow-up needed. URETERS: The ureters demonstrate normal course and caliber. There are no filling defects in the collecting system. No ureteral calculi. URINARY BLADDER: The bladder has normal appearance with a thin wall and without mass lesion or calculus. REPRODUCTIVE ORGANS: Uterus is normal in appearance. No discrete adnexal mass. STOMACH: Unremarkable. SMALL BOWEL: There is a new nonobstructive small bowel loop in the indirect left inguinal hernia LARGE BOWEL: Unremarkable. PERITONEUM: The peritoneum is without evidence for mass lesion or ascites. LYMPH NODES: No enlarged lymph nodes within paraaortic and iliac chain distribution. No enlarged mesenteric nodes or deep pelvic lymph nodes. VASCULATURE: Atherosclerotic calcification with no evidence of aneurysm SOFT TISSUES: Small containing umbilical hernia. Small left indirect left inguinal hernia containing fat and nonobstructed small bowel. BONES: Multilevel degenerative changes in the lower thoracic and lumbar spines.  IMPRESSION:   1. Stable 6 x 7 mm enhancing cortical lesion in the posterior aspect mid pole right kidney with two-year stability continue follow-up in 2-3 year's time. 2. Small left direct inguinal hernia containing fat and a nonobstructive loop of jejunum. 3.New 6 mm pleural-based nodule left lower lobe CT of the chest is recommended.  Started Estrace cream. Insurance did not cover vibegron.   PVR (to ensure adequate emptying): 75 ---------------------------------------------------------------------------------------------------------------------------------------- Interval History (2025): Mirabegron was not covered. Symptoms baseline. Nipple sensitivity with estrace - stopped cream and subsided. Otherwise doing well, denies UTI symptoms. ---------------------------------------------------------------------------------------------------------------------------------------- Interval History (07/15/2025): Started mirabegron from [facility]. Has been on for a month. No side effects. Since stopped taking baths no more UTIs. Current symptoms: frequency, urgency, nocturia  Saw [facility] for hernia - repair 25. Doing well post-op.  PVR (to ensure adequate emptying): 0 ---------------------------------------------------------------------------------------------------------------------------------------- PMH: spinal stenosis, arthritis, HTN, hypothyroid PSH: shoulder replacement, knee replacements,  Family History: no  malignancies Social: , 4 children, retired  now takes care of son, never smoker, no alcohol, no recreational drugs Meds: amlodipine, besylate, levothyroxine, lisinopril, atorvastatin, calcium, metoprolol Allergies: NKDA ROS: no fevers, chills, flank pain